# Patient Record
(demographics unavailable — no encounter records)

---

## 2024-12-30 NOTE — ASSESSMENT
[FreeTextEntry1] : Left thumb sprain concern for ligament rupture - will obtain left thumb MRI without contrast to rule out ligament rupture and followup thereafter. Thumb spica, NSAIDs, NWB.  Left thumb with full disability pending MRI - good prognosis.  F/u after MRI

## 2024-12-30 NOTE — IMAGING
[de-identified] : Left thumb with mild swelling, skin intact. +ttp at MCP, unable to stress. Able to flex and extend at IP. Sensation intact throughout. <2sec cap refill.   Left thumb radiographs with no fracture nor dislocation. Mild MP arthrosis. (3-view as viewed from outside facility)
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
## 2025-01-23 NOTE — ASSESSMENT
[FreeTextEntry1] : EXAM Left thumb with mild swelling, skin intact. +ttp at MCP, unable to stress. Able to flex and extend at IP. Sensation intact throughout. <2sec cap refill.  Left thumb radiographs with no fracture nor dislocation. Mild MP arthrosis. Left thumb MRI with complete UCL insertional tear, +Stener  ASSESSMENT/PLAN Left thumb UCL tear, complete, Stener - reviewed MRI and radiographs with patient which revealed complete rupture of thumb UCL from insertion on proximal phalanx. Did discuss nonoperative vs operative management with patient, but that in light of complete rupture, she is indicated for left thumb UCL repair vs reconstruction. Risks, benefits and alternatives to surgery were discuss including risk of continued instability, pain, stiffness, neurovascular injury, infection, wound dehiscence, DVT and medical complications associated with anesthesia. Patient understood this discussion, questions were answered and patient agreed to proceed with the surgery.  Discussed mild MP arthrosis and that pain from arthrosis will not improve. Focus of procedure is on restoring stability to thumb MP.  Plan for left thumb ulnar collateral ligament repair vs reconstruction [place] [facility] [place]  F/u 10 days after surgery.

## 2025-01-23 NOTE — IMAGING
[de-identified] : Left thumb with mild swelling, skin intact. +ttp at MCP, unable to stress. Able to flex and extend at IP. Sensation intact throughout. <2sec cap refill.   Left thumb radiographs with no fracture nor dislocation. Mild MP arthrosis. (3-view as viewed from outside facility)
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
## 2025-01-23 NOTE — IMAGING
[de-identified] : Left thumb with mild swelling, skin intact. +ttp at MCP, unable to stress. Able to flex and extend at IP. Sensation intact throughout. <2sec cap refill.   Left thumb radiographs with no fracture nor dislocation. Mild MP arthrosis. (3-view as viewed from outside facility)

## 2025-02-10 NOTE — IMAGING
[de-identified] : Left thumb with mild swelling, skin intact. +ttp at MCP, unable to stress. Able to flex and extend at IP. Sensation intact throughout. <2sec cap refill.   Left thumb radiographs with no fracture nor dislocation. Mild MP arthrosis. (3-view as viewed from outside facility)

## 2025-02-10 NOTE — ASSESSMENT
[FreeTextEntry1] : EXAM Left thumb with mild swelling, skin intact. +ttp at MCP, unable to stress. Able to flex and extend at IP. Sensation intact throughout. <2sec cap refill.  Left thumb radiographs with no fracture nor dislocation. Mild MP arthrosis. Left thumb MRI with complete UCL insertional tear, +Stener  ASSESSMENT/PLAN Left thumb UCL tear, complete, Stener - reviewed MRI and radiographs with patient which revealed complete rupture of thumb UCL from insertion on proximal phalanx. Did discuss nonoperative vs operative management with patient, but that in light of complete rupture, she is indicated for left thumb UCL repair vs reconstruction. Risks, benefits and alternatives to surgery were discuss including risk of continued instability, pain, stiffness, neurovascular injury, infection, wound dehiscence, DVT and medical complications associated with anesthesia. Patient understood this discussion, questions were answered and patient agreed to proceed with the surgery.  Discussed mild MP arthrosis and that pain from arthrosis will not improve. Focus of procedure is on restoring stability to thumb MP.  Plan for left thumb ulnar collateral ligament repair vs reconstruction [place] [facility]  F/u 10 days after surgery.

## 2025-02-10 NOTE — IMAGING
[de-identified] : Left thumb with mild swelling, skin intact. +ttp at MCP, unable to stress. Able to flex and extend at IP. Sensation intact throughout. <2sec cap refill.   Left thumb radiographs with no fracture nor dislocation. Mild MP arthrosis. (3-view as viewed from outside facility)
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
## 2025-02-10 NOTE — ASSESSMENT
[FreeTextEntry1] : EXAM Left thumb with mild swelling, skin intact. +ttp at MCP, unable to stress. Able to flex and extend at IP. Sensation intact throughout. <2sec cap refill.  Left thumb radiographs with no fracture nor dislocation. Mild MP arthrosis. Left thumb MRI with complete UCL insertional tear, +Stener  ASSESSMENT/PLAN Left thumb UCL tear, complete, Stener - reviewed MRI and radiographs with patient which revealed complete rupture of thumb UCL from insertion on proximal phalanx. Did discuss nonoperative vs operative management with patient, but that in light of complete rupture, she is indicated for left thumb UCL repair vs reconstruction. Risks, benefits and alternatives to surgery were discuss including risk of continued instability, pain, stiffness, neurovascular injury, infection, wound dehiscence, DVT and medical complications associated with anesthesia. Patient understood this discussion, questions were answered and patient agreed to proceed with the surgery.  Discussed mild MP arthrosis and that pain from arthrosis will not improve. Focus of procedure is on restoring stability to thumb MP.  Plan for left thumb ulnar collateral ligament repair vs reconstruction under local + sedation/MAC at [facility] [place].  F/u 10 days after surgery.

## 2025-03-12 NOTE — IMAGING
[de-identified] : Left thumb with mild swelling, skin intact. +ttp at MCP, unable to stress. Able to flex and extend at IP. Sensation intact throughout. <2sec cap refill.   Left thumb radiographs with no fracture nor dislocation. Mild MP arthrosis. (3-view as viewed from outside facility) 0

## 2025-03-12 NOTE — IMAGING
[de-identified] : Left thumb with mild swelling, skin intact. +ttp at MCP, unable to stress. Able to flex and extend at IP. Sensation intact throughout. <2sec cap refill.   Left thumb radiographs with no fracture nor dislocation. Mild MP arthrosis. (3-view as viewed from outside facility)

## 2025-03-13 NOTE — ASSESSMENT
[FreeTextEntry1] : EXAM Left thumb with incision well healed, no erythema nor drainage. Able to flex to base of small finger and extend at IP. Sensation intact throughout. <2sec cap refill.  Left thumb radiographs with no fracture nor dislocation. Left thumb MRI with complete UCL insertional tear, +Stener  ASSESSMENT/PLAN s/p left thumb UCL repair [01/30/25] - progressing well postop. NWB through 8 week. Ease out of braec, OT for ROM/  F/u 8 weeks
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
